# Patient Record
Sex: FEMALE | Race: BLACK OR AFRICAN AMERICAN | NOT HISPANIC OR LATINO | Employment: UNEMPLOYED | URBAN - METROPOLITAN AREA
[De-identification: names, ages, dates, MRNs, and addresses within clinical notes are randomized per-mention and may not be internally consistent; named-entity substitution may affect disease eponyms.]

---

## 2023-02-01 ENCOUNTER — HOSPITAL ENCOUNTER (EMERGENCY)
Facility: CLINIC | Age: 63
Discharge: HOME | End: 2023-02-01
Attending: EMERGENCY MEDICINE | Admitting: EMERGENCY MEDICINE
Payer: COMMERCIAL

## 2023-02-01 ENCOUNTER — APPOINTMENT (OUTPATIENT)
Dept: GENERAL RADIOLOGY | Facility: CLINIC | Age: 63
End: 2023-02-01
Attending: EMERGENCY MEDICINE
Payer: COMMERCIAL

## 2023-02-01 VITALS
HEART RATE: 80 BPM | RESPIRATION RATE: 18 BRPM | TEMPERATURE: 98.1 F | DIASTOLIC BLOOD PRESSURE: 87 MMHG | SYSTOLIC BLOOD PRESSURE: 122 MMHG | OXYGEN SATURATION: 99 %

## 2023-02-01 DIAGNOSIS — R07.81 RIB PAIN: ICD-10-CM

## 2023-02-01 PROCEDURE — 99283 EMERGENCY DEPT VISIT LOW MDM: CPT

## 2023-02-01 PROCEDURE — 250N000013 HC RX MED GY IP 250 OP 250 PS 637: Performed by: EMERGENCY MEDICINE

## 2023-02-01 PROCEDURE — 71101 X-RAY EXAM UNILAT RIBS/CHEST: CPT | Mod: LT

## 2023-02-01 RX ORDER — LIDOCAINE 4 G/G
1 PATCH TOPICAL ONCE
Status: DISCONTINUED | OUTPATIENT
Start: 2023-02-01 | End: 2023-02-01 | Stop reason: HOSPADM

## 2023-02-01 RX ORDER — LIDOCAINE 4 G/G
1 PATCH TOPICAL EVERY 24 HOURS
Qty: 5 PATCH | Refills: 0 | Status: SHIPPED | OUTPATIENT
Start: 2023-02-01

## 2023-02-01 RX ORDER — OXYCODONE HYDROCHLORIDE 5 MG/1
5 TABLET ORAL ONCE
Status: COMPLETED | OUTPATIENT
Start: 2023-02-01 | End: 2023-02-01

## 2023-02-01 RX ADMIN — LIDOCAINE PATCH 4% 1 PATCH: 40 PATCH TOPICAL at 19:42

## 2023-02-01 RX ADMIN — OXYCODONE HYDROCHLORIDE 5 MG: 5 TABLET ORAL at 19:42

## 2023-02-01 ASSESSMENT — ENCOUNTER SYMPTOMS
RHINORRHEA: 0
SHORTNESS OF BREATH: 0
DYSURIA: 0
NAUSEA: 0
CHILLS: 0
FEVER: 0
VOMITING: 0
ARTHRALGIAS: 1
DIARRHEA: 0
COUGH: 0

## 2023-02-02 NOTE — ED PROVIDER NOTES
History     Chief Complaint:  Rib pain    The history is provided by a relative. A  was used (Liberian).      Stormy Perera is a 63 year old female with a history of type 2 diabetes mellitus who presents with left rib pain onset two days prior after a sit-up workout. She states that the area appeared to be visually swollen, but was resolving with the use of Ibuprofen and ice. Today the pain began worsening and her mobility was becoming impaired. She reports using Ibuprofen approximately eight hours prior to ED presentation which is still alleviating symptoms, but only for short periods. She denies chest pain or shortness of breath, however mentions increased pain upon deep breathing. Denies fever, chills, cough, rhinorrhea, abdominal pain, nausea, vomiting, diarrhea, or dysuria.      Independent Historian: Yes, supplemented and interpreted by her daughter at bedside.    Review of External Notes: 11/18/22 Urgent care visit    ROS:  Review of Systems   Constitutional: Negative for chills and fever.   HENT: Negative for rhinorrhea.    Respiratory: Negative for cough and shortness of breath.    Cardiovascular: Negative for chest pain.   Gastrointestinal: Negative for diarrhea, nausea and vomiting.   Genitourinary: Negative for dysuria.   Musculoskeletal: Positive for arthralgias (Left rib).   All other systems reviewed and are negative.    Allergies:  No Known Allergies     Medications:    Excedrin, Migraine  Albuterol  Robitussin AC    Past Medical History:    GERD  Hypoalbuminemia  Vitamin D deficiency  Type 2 diabetes mellitus   Chronic insomnia  Normocytic anemia  Hyperlipidemia  Obesity  Endometriosis of pelvic peritoneum    Past Surgical History:    Laparoscopic cholecystectomy    Social History:  Presents to the ED with her daughter.  PCP: [facility], [place]     Physical Exam     Patient Vitals for the past 24 hrs:   BP Temp Temp src Pulse Resp SpO2   02/01/23 1919 (!) 124/99 98.1  F  (36.7  C) Oral 92 18 99 %        Physical Exam  Nursing note and vitals reviewed.  Constitutional: Well nourished.    Eyes: Conjunctiva normal.  Pupils are equal, round, and reactive to light.   ENT: Nose normal. Mucous membranes pink and moist.    Neck: Normal range of motion.  CVS: Normal rate, regular rhythm.  Normal heart sounds.   Pulmonary: Lungs clear to auscultation bilaterally. No wheezes/rales/rhonchi. Reproducible pinpoint tenderness below L. Rib, no overlying ecchymosis/warmth/crepitance or rash  GI: Abdomen soft. Nontender, nondistended. No rigidity or guarding.    MSK: No calf tenderness or swelling.  Neuro: Alert. Follows simple commands.  Skin: Skin is warm and dry. No rash noted.   Psychiatric: Normal affect.       Emergency Department Course     Imaging:  Ribs XR, unilat 3 views + PA chest,  left   Final Result   IMPRESSION: No definite rib fracture is identified. No evidence of acute cardiopulmonary disease.         Report per radiology    Emergency Department Course & Assessments:  Interventions:  Medications   Lidocaine (LIDOCARE) 4 % Patch 1 patch (1 patch Transdermal Patch/Med Applied 2/1/23 1942)   oxyCODONE (ROXICODONE) tablet 5 mg (5 mg Oral Given 2/1/23 1942)       Assessments:  1950 I obtained history and examined the patient as noted above.  2013 I rechecked the patient and explained findings. I believe that they are safe for discharge at this time.    Disposition:  The patient was discharged to home.     Impression & Plan      Medical Decision Making:  Patient is a 63-year-old female presenting with predominantly complaints of rib pain after reportedly doing sit ups a few days prior.  She is nontoxic, in no significant distress.  She has reproducible tenderness to palpation on exam and is requesting x-ray to confirm no rib fractures.  No identifiable fractures, pneumothorax or other pathology identified on x-ray.  She denies any chest pain and I doubt cardiopulmonary process.  Stronger  suspicion for more musculoskeletal pain.  I did recommend lidocaine patches as well as tylenol/motrin regularly if needed.  She did report symptom improvement during her time in the ED.  I do not feel further emergent workup is warranted at this time though encouraged close PCP f/u. Monitor for fever, chills, abdominal pain or should symptoms worsen to promptly represent.     Diagnosis:    ICD-10-CM    1. Rib pain  R07.81            Discharge Medications:  New Prescriptions    LIDOCAINE (LIDOCARE) 4 % PATCH    Place 1 patch onto the skin every 24 hours To prevent lidocaine toxicity, patient should be patch free for 12 hrs daily.     Scribe Disclosure:  I, Ezequiel Buitrago, am serving as a scribe at 8:08 PM on 2/1/2023 to document services personally performed by Akosua Fan DO based on my observations and the provider's statements to me.   2/1/2023   Akosua Fan DO McDonald, Lindsey E, DO  02/01/23 2025

## 2023-02-02 NOTE — ED TRIAGE NOTES
Doing some push up exercises 2 days ago, heard a snap and experienced some left sided rib pain. Pain progressively getting worse. Worse with movement. SOB with bending or standing up.